# Patient Record
Sex: MALE | Race: WHITE | NOT HISPANIC OR LATINO
[De-identification: names, ages, dates, MRNs, and addresses within clinical notes are randomized per-mention and may not be internally consistent; named-entity substitution may affect disease eponyms.]

---

## 2022-12-28 ENCOUNTER — NON-APPOINTMENT (OUTPATIENT)
Age: 47
End: 2022-12-28

## 2023-01-09 ENCOUNTER — APPOINTMENT (OUTPATIENT)
Dept: ORTHOPEDIC SURGERY | Facility: CLINIC | Age: 48
End: 2023-01-09
Payer: COMMERCIAL

## 2023-01-09 VITALS — BODY MASS INDEX: 29.82 KG/M2 | HEIGHT: 67 IN | WEIGHT: 190 LBS

## 2023-01-09 DIAGNOSIS — M17.12 UNILATERAL PRIMARY OSTEOARTHRITIS, LEFT KNEE: ICD-10-CM

## 2023-01-09 DIAGNOSIS — E78.00 PURE HYPERCHOLESTEROLEMIA, UNSPECIFIED: ICD-10-CM

## 2023-01-09 DIAGNOSIS — Z78.9 OTHER SPECIFIED HEALTH STATUS: ICD-10-CM

## 2023-01-09 PROCEDURE — 99204 OFFICE O/P NEW MOD 45 MIN: CPT

## 2023-01-09 PROCEDURE — 73562 X-RAY EXAM OF KNEE 3: CPT | Mod: LT
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
## 2023-01-09 NOTE — ASSESSMENT
[FreeTextEntry1] : **S/P LT knee ACL reconstruction in 2005.  \par \par Possible, but unlikely, meniscus tear in LT knee.

## 2023-01-09 NOTE — IMAGING
[de-identified] : LT Knee Exam: S/P LT knee ACL reconstruction in 2005 Evidence of bone-patellar tendon-bone.  No effusion.  Sitting Alison's test is negative in both directions.  Standing Alison's test is negative.  No joint line tenderness.  Possible mild varus angulation.  Slight MCL laxity.  Anterior draw is negative.  There is no lateral laxity.  ROM is from full extension to at least 120 degrees.   No medial or lateral collateral laxity.\par \par LLE/ Calf Exam:  No tenderness over the Achillis tendon or its enthesis.  There is robust muscle development of the calf.  No midline tenderness with firm palpation.  SLR is negative, and does not produce pain or symptoms with dorsiflexion   There is no tenderness of the medial or lateral head of the gastroc.  As he enters a squat, this produces an uncomfortable stretching sensation over distal half of the calf.\par \par X-ray of the LT knee (3 views) on 01/09/2023: Tibial and femoral interference screws in proper position.  Complete fusion of bone graft.  No STC.  There is early narrowing of medial compartment with slight varus.  There is no lateral narrowing, although there is a small spur formation at the most lateral distal part of the femur.  PF compartment shows uniform narrowing to approximately 1/3 of full without tilt or glide.  Small medial and lateral border osteophytes.

## 2023-01-09 NOTE — DISCUSSION/SUMMARY
[de-identified] : 1) I discussed the risks, benefits and alternatives of treatment options for the RT knee and calf, including activity modification, rest, continuing home exercises and walking/jogging, oral antiinflammatory medication, or observation. I discussed the prognosis and clinical history of the patient's diagnosis. I discussed that he may have a meniscus tear, and no further imaging will be performed at this time unless symptoms worsen or continue.\par 2) The patient will take OTC NSAIDs PRN for pain. \par 3) Pt will continue with activity modification and home exercise as tolerated.  The patient should avoid exercise and activity that aggravates pain. \par \par \par The patient will continue with conservative treatment as described above, and may F/U PRN.\par \par NSAIDs- The patient was informed of the risks, benefits and alternatives of this medication, including risks to the GI tract, kidneys, liver, increased blood pressure, cardiac risk, and risk of fluid retention.  Advised to clear medication with internist or PCP if any concurrent health problem with heart, blood pressure, or GI system exists.  The patient was instructed on the proper usage of NSAIDs. \par \par The patient was advised of the diagnosis.  The natural history of the pathology was explained in full to the patient in layman's terms, including but not limited to the risks, symptoms and available options for treatment.  We discussed the risks, benefits and alternatives of the treatment options and the advice I provided to the patient as listed above.  Pt was given the opportunity to ask questions, and all questions were answered.  The discussion was not limited to the above.\par \par Entered by Tommy Titus acting as scribe.\par

## 2024-01-25 ENCOUNTER — NON-APPOINTMENT (OUTPATIENT)
Age: 49
End: 2024-01-25

## 2024-02-07 ENCOUNTER — NON-APPOINTMENT (OUTPATIENT)
Age: 49
End: 2024-02-07

## 2025-09-02 ENCOUNTER — APPOINTMENT (OUTPATIENT)
Dept: ORTHOPEDIC SURGERY | Facility: CLINIC | Age: 50
End: 2025-09-02
Payer: COMMERCIAL

## 2025-09-02 VITALS — BODY MASS INDEX: 29.82 KG/M2 | HEIGHT: 67 IN | WEIGHT: 190 LBS

## 2025-09-02 DIAGNOSIS — M75.52 BURSITIS OF LEFT SHOULDER: ICD-10-CM

## 2025-09-02 PROCEDURE — 99204 OFFICE O/P NEW MOD 45 MIN: CPT | Mod: 25

## 2025-09-02 PROCEDURE — 20611 DRAIN/INJ JOINT/BURSA W/US: CPT | Mod: LT

## 2025-09-02 PROCEDURE — 73030 X-RAY EXAM OF SHOULDER: CPT | Mod: LT

## 2025-09-02 PROCEDURE — J3490M: CUSTOM

## 2025-09-02 RX ORDER — ROSUVASTATIN CALCIUM 10 MG/1
10 TABLET, FILM COATED ORAL
Refills: 0 | Status: ACTIVE | COMMUNITY

## 2025-09-02 RX ORDER — RAMIPRIL 5 MG/1
5 CAPSULE ORAL
Refills: 0 | Status: ACTIVE | COMMUNITY

## 2025-09-02 RX ORDER — CELECOXIB 200 MG/1
200 CAPSULE ORAL
Qty: 30 | Refills: 0 | Status: ACTIVE | COMMUNITY
Start: 2025-09-02 | End: 1900-01-01